# Patient Record
Sex: FEMALE | Race: WHITE | Employment: OTHER | URBAN - METROPOLITAN AREA
[De-identification: names, ages, dates, MRNs, and addresses within clinical notes are randomized per-mention and may not be internally consistent; named-entity substitution may affect disease eponyms.]

---

## 2022-06-04 ENCOUNTER — TELEPHONE ENCOUNTER (OUTPATIENT)
Dept: URBAN - METROPOLITAN AREA CLINIC 68 | Facility: CLINIC | Age: 82
End: 2022-06-04

## 2022-06-04 RX ORDER — VALSARTAN 80 MG/1
TABLET ORAL
OUTPATIENT
Start: 2008-04-04 | End: 2012-07-26

## 2022-06-04 RX ORDER — SACCHAROMYCES BOULARDII 250 MG
CAPSULE ORAL
Qty: 60 | Refills: 0 | OUTPATIENT
Start: 2013-03-28 | End: 2013-04-27

## 2022-06-04 RX ORDER — MESALAMINE 375 MG/1
CAPSULE, EXTENDED RELEASE ORAL DAILY
Qty: 180 | Refills: 180 | OUTPATIENT
Start: 2015-03-30 | End: 2016-09-15

## 2022-06-04 RX ORDER — COLESEVELAM HYDROCHLORIDE 625 MG/1
WELCHOL(    6 TABLETS DAILY ) INACTIVE -HX ENTRY TABLET, FILM COATED ORAL
OUTPATIENT
Start: 2006-04-17

## 2022-06-04 RX ORDER — SODIUM SULFATE, POTASSIUM SULFATE, MAGNESIUM SULFATE 17.5; 3.13; 1.6 G/ML; G/ML; G/ML
SOLUTION, CONCENTRATE ORAL AS DIRECTED
Qty: 1 | Refills: 0 | OUTPATIENT
Start: 2020-02-13 | End: 2020-02-14

## 2022-06-04 RX ORDER — ATENOLOL 25 MG/1
ATENOLOL( 25MG ORAL  DAILY ) INACTIVE -HX ENTRY TABLET ORAL DAILY
OUTPATIENT
Start: 2020-02-13

## 2022-06-04 RX ORDER — COLESEVELAM HYDROCHLORIDE 625 MG/1
TABLET, FILM COATED ORAL
Qty: 90 | Refills: 90 | OUTPATIENT
Start: 2016-10-12 | End: 2020-02-13

## 2022-06-04 RX ORDER — HYOSCYAMINE SULFATE 0.12 MG/1
TABLET, ORALLY DISINTEGRATING ORAL PRN
Qty: 30 | Refills: 30 | OUTPATIENT
Start: 2013-01-08 | End: 2016-09-15

## 2022-06-04 RX ORDER — DICLOFENAC SODIUM 75 MG/1
TABLET, DELAYED RELEASE ORAL
OUTPATIENT
Start: 2011-07-26 | End: 2012-07-26

## 2022-06-04 RX ORDER — PAROXETINE HYDROCHLORIDE 40 MG/1
TABLET, FILM COATED ORAL
OUTPATIENT
Start: 2006-04-17 | End: 2012-07-26

## 2022-06-04 RX ORDER — VALSARTAN 80 MG/1
DIOVAN( 80MG ORAL 1 DAILY ) INACTIVE -HX ENTRY TABLET ORAL DAILY
OUTPATIENT
Start: 2020-02-13

## 2022-06-04 RX ORDER — DICLOFENAC SODIUM 75 MG/1
DICLOFENAC SODIUM( 75MG ORAL 1 DAILY ) INACTIVE -HX ENTRY TABLET, DELAYED RELEASE ORAL DAILY
OUTPATIENT
Start: 2013-03-28

## 2022-06-04 RX ORDER — ERGOCALCIFEROL 1.25 MG/1
CAPSULE ORAL
OUTPATIENT
Start: 2011-06-29 | End: 2012-07-26

## 2022-06-04 RX ORDER — COLESEVELAM HYDROCHLORIDE 625 MG/1
WELCHOL(    ONE TABLET BY MOUTH TWICE DAILY ) INACTIVE -HX ENTRY TABLET, FILM COATED ORAL
OUTPATIENT
Start: 2008-07-09

## 2022-06-04 RX ORDER — ALPRAZOLAM 0.25 MG
XANAX( 0.25MG ORAL  AS NEEDED ) INACTIVE -HX ENTRY TABLET ORAL AS NEEDED
OUTPATIENT
Start: 2020-02-13

## 2022-06-04 RX ORDER — MESALAMINE 400 MG/1
CAPSULE, DELAYED RELEASE ORAL
Qty: 540 | Refills: 540 | OUTPATIENT
Start: 2013-05-13 | End: 2013-05-22

## 2022-06-04 RX ORDER — PREDNISONE 5 MG/1
PREDNISONE 5MG, 1 TABLET TAKE 1 AND 1/2 TABLETS ORALLY EACH DAY FOR 7 DAYS THEN 1 TABELT ORALLY EACH DAY FOR 7 DAYS THEN 1/2 TABLET ORALLY EACH DAY FOR 7 DAYS  THEN 1 AND 1/2 TABLETS ORALLY EACH DAY FOR 1 WEEK THEN 1 TABLET ORALLY EACH DAY FOR 1 WEEK # 60 TABLET ORAL
Qty: 60 | Refills: 60 | OUTPATIENT
Start: 2012-08-29 | End: 2013-01-08

## 2022-06-04 RX ORDER — COLESEVELAM HYDROCHLORIDE 625 MG/1
TABLET, FILM COATED ORAL
OUTPATIENT
Start: 2011-06-29 | End: 2012-07-26

## 2022-06-04 RX ORDER — COLESEVELAM HYDROCHLORIDE 625 MG/1
WELCHOL(    ONE TABLET BY MOUTH TWICE DAILY ) INACTIVE -HX ENTRY TABLET, FILM COATED ORAL
OUTPATIENT
Start: 2008-04-23

## 2022-06-04 RX ORDER — DICLOFENAC SODIUM 75 MG/1
DICLOFENAC SODIUM( 75MG ORAL  DAILY ) INACTIVE -HX ENTRY TABLET, DELAYED RELEASE ORAL DAILY
OUTPATIENT
Start: 2013-10-02

## 2022-06-04 RX ORDER — EZETIMIBE 10 MG/1
ZETIA(    1 TABLET DAILY ) INACTIVE -HX ENTRY TABLET ORAL
OUTPATIENT
Start: 2006-04-17

## 2022-06-04 RX ORDER — RALOXIFENE HYDROCHLORIDE 60 MG/1
RALOXIFENE HCL( 60MG ORAL  DAILY ) INACTIVE -HX ENTRY TABLET, FILM COATED ORAL DAILY
OUTPATIENT
Start: 2020-02-13

## 2022-06-04 RX ORDER — RALOXIFENE HCL 60 MG
EVISTA(    1 TABLET DAILY ) INACTIVE -HX ENTRY TABLET ORAL
OUTPATIENT
Start: 2006-04-17

## 2022-06-04 RX ORDER — CHOLESTYRAMINE 4 G/9G
POWDER, FOR SUSPENSION ORAL
Qty: 1 | Refills: 0 | OUTPATIENT
Start: 2013-03-29 | End: 2013-04-28

## 2022-06-05 ENCOUNTER — TELEPHONE ENCOUNTER (OUTPATIENT)
Dept: URBAN - METROPOLITAN AREA CLINIC 68 | Facility: CLINIC | Age: 82
End: 2022-06-05

## 2022-06-05 RX ORDER — PAROXETINE HYDROCHLORIDE 40 MG/1
PAXIL( 40MG ORAL  DAILY ) ACTIVE -HX ENTRY TABLET, FILM COATED ORAL DAILY
Status: ACTIVE | COMMUNITY
Start: 2020-02-13

## 2022-06-05 RX ORDER — METOPROLOL SUCCINATE 100 MG/1
METOPROLOL SUCCINATE ER( 100MG ORAL 1 DAILY ) ACTIVE -HX ENTRY TABLET, EXTENDED RELEASE ORAL DAILY
Status: ACTIVE | COMMUNITY
Start: 2020-02-13

## 2022-06-05 RX ORDER — COLESEVELAM HYDROCHLORIDE 625 MG/1
WELCHOL( 625MG ORAL 1 FOUR TIMES DAILY ) ACTIVE -HX ENTRY TABLET, FILM COATED ORAL
Status: ACTIVE | COMMUNITY
Start: 2020-02-13

## 2022-06-05 RX ORDER — OMEPRAZOLE 20 MG/1
OMEPRAZOLE( 20MG ORAL  DAILY ) ACTIVE -HX ENTRY CAPSULE, DELAYED RELEASE ORAL DAILY
Status: ACTIVE | COMMUNITY
Start: 2020-02-13

## 2022-06-25 ENCOUNTER — TELEPHONE ENCOUNTER (OUTPATIENT)
Age: 82
End: 2022-06-25

## 2022-06-25 RX ORDER — ALPRAZOLAM 0.25 MG
XANAX( 0.25MG ORAL  AS NEEDED ) INACTIVE -HX ENTRY TABLET ORAL AS NEEDED
OUTPATIENT
Start: 2020-02-13

## 2022-06-25 RX ORDER — COLESEVELAM HYDROCHLORIDE 625 MG/1
WELCHOL(    ONE TABLET BY MOUTH TWICE DAILY ) INACTIVE -HX ENTRY TABLET, FILM COATED ORAL
OUTPATIENT
Start: 2008-07-09

## 2022-06-25 RX ORDER — ALPRAZOLAM 0.5 MG/1
ALPRAZOLAM(    PRN ) INACTIVE -HX ENTRY TABLET, EXTENDED RELEASE ORAL PRN
OUTPATIENT
Start: 2011-07-26

## 2022-06-25 RX ORDER — ASPIRIN 81 MG/1
LOW-DOSE ASPIRIN( 81MG ORAL  DAILY ) INACTIVE -HX ENTRY TABLET, COATED ORAL DAILY
OUTPATIENT
Start: 2013-03-28

## 2022-06-25 RX ORDER — LIDOCAINE HYDROCHLORIDE 20 MG/ML
SOLUTION TOPICAL
Qty: 1 | Refills: 0 | OUTPATIENT
Start: 2013-04-02 | End: 2013-05-02

## 2022-06-25 RX ORDER — MESALAMINE 800 MG/1
ASACOL(    2 TABLETS 3 TIMES DAILY ) INACTIVE -HX ENTRY TABLET, DELAYED RELEASE ORAL
OUTPATIENT
Start: 2006-04-17

## 2022-06-25 RX ORDER — ATENOLOL 25 MG/1
ATENOLOL(    1 TABLET DAILY ) INACTIVE -HX ENTRY TABLET ORAL
OUTPATIENT
Start: 2011-07-26

## 2022-06-25 RX ORDER — ATENOLOL 25 MG/1
ATENOLOL(    1 TABLET DAILY ) INACTIVE -HX ENTRY TABLET ORAL
OUTPATIENT
Start: 2011-06-29

## 2022-06-25 RX ORDER — ASPIRIN 81 MG/1
BABY ASPIRIN( 81MG ORAL   ) INACTIVE -HX ENTRY TABLET, COATED ORAL
OUTPATIENT
Start: 2015-03-30

## 2022-06-25 RX ORDER — RALOXIFENE HCL 60 MG
EVISTA(    1 TABLET DAILY ) INACTIVE -HX ENTRY TABLET ORAL
OUTPATIENT
Start: 2006-04-17

## 2022-06-25 RX ORDER — CHOLESTYRAMINE 4 G/9G
POWDER, FOR SUSPENSION ORAL
Qty: 1 | Refills: 0 | OUTPATIENT
Start: 2013-03-29 | End: 2013-04-28

## 2022-06-25 RX ORDER — COLESEVELAM HYDROCHLORIDE 625 MG/1
WELCHOL(    ONE TABLET BY MOUTH TWICE DAILY ) INACTIVE -HX ENTRY TABLET, FILM COATED ORAL
OUTPATIENT
Start: 2008-04-23

## 2022-06-25 RX ORDER — SODIUM SULFATE, POTASSIUM SULFATE, MAGNESIUM SULFATE 17.5; 3.13; 1.6 G/ML; G/ML; G/ML
SOLUTION, CONCENTRATE ORAL AS DIRECTED
Qty: 1 | Refills: 0 | OUTPATIENT
Start: 2020-02-13 | End: 2020-02-14

## 2022-06-25 RX ORDER — HYOSCYAMINE SULFATE 0.12 MG/1
LEVSIN/SL(    1TAB SL Q4-6 HOURS PRN ) INACTIVE -HX ENTRY TABLET, ORALLY DISINTEGRATING ORAL
OUTPATIENT
Start: 2011-06-29

## 2022-06-25 RX ORDER — MESALAMINE 400 MG/1
CAPSULE, DELAYED RELEASE ORAL
Qty: 540 | Refills: 540 | OUTPATIENT
Start: 2013-05-13 | End: 2013-05-22

## 2022-06-25 RX ORDER — ATENOLOL 25 MG/1
ATENOLOL( 25MG ORAL  DAILY ) INACTIVE -HX ENTRY TABLET ORAL DAILY
OUTPATIENT
Start: 2020-02-13

## 2022-06-25 RX ORDER — MESALAMINE 800 MG/1
ASACOL(    3TID ) INACTIVE -HX ENTRY TABLET, DELAYED RELEASE ORAL
OUTPATIENT
Start: 2008-04-04

## 2022-06-25 RX ORDER — COLESEVELAM HYDROCHLORIDE 625 MG/1
TABLET, FILM COATED ORAL
OUTPATIENT
Start: 2011-06-29 | End: 2012-07-26

## 2022-06-25 RX ORDER — MESALAMINE 800 MG/1
ASACOL( 400MG ORAL  6 TABS DAILY ) INACTIVE -HX ENTRY TABLET, DELAYED RELEASE ORAL
OUTPATIENT
Start: 2013-05-13

## 2022-06-25 RX ORDER — ERGOCALCIFEROL 1.25 MG/1
CAPSULE ORAL
OUTPATIENT
Start: 2011-06-29 | End: 2012-07-26

## 2022-06-25 RX ORDER — VALSARTAN 80 MG
DIOVAN( 80MG ORAL 1 DAILY ) INACTIVE -HX ENTRY TABLET ORAL DAILY
OUTPATIENT
Start: 2020-02-13

## 2022-06-25 RX ORDER — PREDNISONE 5 MG/1
PREDNISONE 5MG, 1 TABLET TAKE 1 AND 1/2 TABLETS ORALLY EACH DAY FOR 7 DAYS THEN 1 TABELT ORALLY EACH DAY FOR 7 DAYS THEN 1/2 TABLET ORALLY EACH DAY FOR 7 DAYS  THEN 1 AND 1/2 TABLETS ORALLY EACH DAY FOR 1 WEEK THEN 1 TABLET ORALLY EACH DAY FOR 1 WEEK # 60 TABLET ORAL
Qty: 60 | Refills: 60 | OUTPATIENT
Start: 2012-08-29 | End: 2013-01-08

## 2022-06-25 RX ORDER — EZETIMIBE 10 MG/1
ZETIA(    1 TABLET DAILY ) INACTIVE -HX ENTRY TABLET ORAL
OUTPATIENT
Start: 2006-04-17

## 2022-06-25 RX ORDER — RALOXIFENE HYDROCHLORIDE 60 MG/1
RALOXIFENE HCL( 60MG ORAL  DAILY ) INACTIVE -HX ENTRY TABLET, FILM COATED ORAL DAILY
OUTPATIENT
Start: 2020-02-13

## 2022-06-25 RX ORDER — VALSARTAN 80 MG
TABLET ORAL
OUTPATIENT
Start: 2008-04-04 | End: 2012-07-26

## 2022-06-25 RX ORDER — PAROXETINE HYDROCHLORIDE 40 MG/1
TABLET, FILM COATED ORAL
OUTPATIENT
Start: 2006-04-17 | End: 2012-07-26

## 2022-06-25 RX ORDER — POLYETHYLENE GLYCOL 3350, SODIUM SULFATE, SODIUM CHLORIDE, POTASSIUM CHLORIDE, ASCORBIC ACID, SODIUM ASCORBATE 7.5-2.691G
MOVIPREP(    AS DIRECTED ) INACTIVE -HX ENTRY KIT ORAL AS DIRECTED
OUTPATIENT
Start: 2011-06-29

## 2022-06-25 RX ORDER — ASPIRIN 81 MG/1
TABLET, DELAYED RELEASE ORAL
OUTPATIENT
Start: 2006-04-17 | End: 2012-07-26

## 2022-06-25 RX ORDER — COLESEVELAM HYDROCHLORIDE 625 MG/1
TABLET, FILM COATED ORAL
Qty: 90 | Refills: 90 | OUTPATIENT
Start: 2016-10-12 | End: 2020-02-13

## 2022-06-25 RX ORDER — SACCHAROMYCES BOULARDII 250 MG
CAPSULE ORAL
Qty: 60 | Refills: 0 | OUTPATIENT
Start: 2013-03-28 | End: 2013-04-27

## 2022-06-25 RX ORDER — MESALAMINE 800 MG/1
ASACOL(    3 TABLETS ORALLY THREE TIMES EACH DAY ) INACTIVE -HX ENTRY TABLET, DELAYED RELEASE ORAL
OUTPATIENT
Start: 2008-01-14

## 2022-06-25 RX ORDER — DICLOFENAC SODIUM 1 %
VOLTAREN(    1 TABLET TWICE DAILY ) INACTIVE -HX ENTRY GEL (GRAM) TOPICAL
OUTPATIENT
Start: 2011-06-29

## 2022-06-25 RX ORDER — DICLOFENAC SODIUM 75 MG/1
DICLOFENAC SODIUM( 75MG ORAL 1 DAILY ) INACTIVE -HX ENTRY TABLET, DELAYED RELEASE ORAL DAILY
OUTPATIENT
Start: 2013-03-28

## 2022-06-25 RX ORDER — CHOLECALCIFEROL (VITAMIN D3) 25 MCG
VITAMIN D( 1000UNIT ORAL  DAILY ) INACTIVE -HX ENTRY TABLET ORAL DAILY
OUTPATIENT
Start: 2015-03-30

## 2022-06-25 RX ORDER — VANCOMYCIN 1 G/200ML
INJECTION, SOLUTION INTRAVENOUS
Qty: 280 | Refills: 0 | OUTPATIENT
Start: 2013-04-02 | End: 2013-04-16

## 2022-06-25 RX ORDER — HYOSCYAMINE SULFATE 0.12 MG/1
TABLET, ORALLY DISINTEGRATING ORAL PRN
Qty: 30 | Refills: 30 | OUTPATIENT
Start: 2013-01-08 | End: 2016-09-15

## 2022-06-25 RX ORDER — BISACODYL 5 MG/1
HALFLYTELY & BISACODYL(    AS DIRECTED ) INACTIVE -HX ENTRY TABLET, COATED ORAL AS DIRECTED
OUTPATIENT
Start: 2008-04-04

## 2022-06-25 RX ORDER — DICLOFENAC SODIUM 75 MG/1
DICLOFENAC SODIUM( 75MG ORAL  DAILY ) INACTIVE -HX ENTRY TABLET, DELAYED RELEASE ORAL DAILY
OUTPATIENT
Start: 2013-10-02

## 2022-06-25 RX ORDER — BUDESONIDE 9 MG/1
ENTOCORT EC(    TAKE 3 TABLETS ORALLY EACH DAY ) INACTIVE -HX ENTRY TABLET, EXTENDED RELEASE ORAL
OUTPATIENT
Start: 2011-07-26

## 2022-06-25 RX ORDER — MESALAMINE 375 MG/1
CAPSULE, EXTENDED RELEASE ORAL DAILY
Qty: 180 | Refills: 180 | OUTPATIENT
Start: 2015-03-30 | End: 2016-09-15

## 2022-06-25 RX ORDER — COLESEVELAM HYDROCHLORIDE 625 MG/1
WELCHOL(    6 TABLETS DAILY ) INACTIVE -HX ENTRY TABLET, FILM COATED ORAL
OUTPATIENT
Start: 2006-04-17

## 2022-06-25 RX ORDER — DICLOFENAC SODIUM 75 MG/1
TABLET, DELAYED RELEASE ORAL
OUTPATIENT
Start: 2011-07-26 | End: 2012-07-26

## 2022-06-25 RX ORDER — SODIUM SULFATE, POTASSIUM SULFATE, MAGNESIUM SULFATE 17.5; 3.13; 1.6 G/ML; G/ML; G/ML
SOLUTION, CONCENTRATE ORAL AS DIRECTED
Qty: 1 | Refills: 0 | OUTPATIENT
Start: 2016-09-15 | End: 2016-09-16

## 2022-06-26 ENCOUNTER — TELEPHONE ENCOUNTER (OUTPATIENT)
Age: 82
End: 2022-06-26

## 2022-06-26 RX ORDER — PAROXETINE HYDROCHLORIDE 40 MG/1
PAXIL( 40MG ORAL  DAILY ) ACTIVE -HX ENTRY TABLET, FILM COATED ORAL DAILY
Status: ACTIVE | COMMUNITY
Start: 2020-02-13

## 2022-06-26 RX ORDER — METOPROLOL SUCCINATE 100 MG/1
METOPROLOL SUCCINATE ER( 100MG ORAL 1 DAILY ) ACTIVE -HX ENTRY TABLET, EXTENDED RELEASE ORAL DAILY
Status: ACTIVE | COMMUNITY
Start: 2020-02-13

## 2022-06-26 RX ORDER — COLESEVELAM HYDROCHLORIDE 625 MG/1
WELCHOL( 625MG ORAL 1 FOUR TIMES DAILY ) ACTIVE -HX ENTRY TABLET, FILM COATED ORAL
Status: ACTIVE | COMMUNITY
Start: 2020-02-13

## 2022-06-26 RX ORDER — OMEPRAZOLE 20 MG/1
OMEPRAZOLE( 20MG ORAL  DAILY ) ACTIVE -HX ENTRY CAPSULE, DELAYED RELEASE ORAL DAILY
Status: ACTIVE | COMMUNITY
Start: 2020-02-13

## 2023-03-01 ENCOUNTER — OFFICE VISIT (OUTPATIENT)
Dept: URBAN - METROPOLITAN AREA CLINIC 68 | Facility: CLINIC | Age: 83
End: 2023-03-01

## 2025-04-09 ENCOUNTER — LAB OUTSIDE AN ENCOUNTER (OUTPATIENT)
Dept: URBAN - METROPOLITAN AREA CLINIC 68 | Facility: CLINIC | Age: 85
End: 2025-04-09

## 2025-04-09 ENCOUNTER — OFFICE VISIT (OUTPATIENT)
Dept: URBAN - METROPOLITAN AREA CLINIC 68 | Facility: CLINIC | Age: 85
End: 2025-04-09
Payer: MEDICARE

## 2025-04-09 DIAGNOSIS — K51.90 ULCERATIVE COLITIS WITHOUT COMPLICATIONS, UNSPECIFIED LOCATION: ICD-10-CM

## 2025-04-09 PROBLEM — 64766004: Status: ACTIVE | Noted: 2025-04-09

## 2025-04-09 PROCEDURE — 99204 OFFICE O/P NEW MOD 45 MIN: CPT | Performed by: INTERNAL MEDICINE

## 2025-04-09 RX ORDER — PAROXETINE HYDROCHLORIDE 40 MG/1
PAXIL( 40MG ORAL  DAILY ) ACTIVE -HX ENTRY TABLET, FILM COATED ORAL DAILY
Status: ACTIVE | COMMUNITY
Start: 2020-02-13

## 2025-04-09 RX ORDER — SOD SULF/POT CHLORIDE/MAG SULF 1.479 G
12 TABLETS THE FIRST DOSE THE EVENING BEFORE AND SECOND DOSE THE MORNING OF COLONOSCOPY TABLET ORAL TWICE A DAY
Qty: 24 | OUTPATIENT
Start: 2025-04-09 | End: 2025-04-10

## 2025-04-09 RX ORDER — COLESEVELAM HYDROCHLORIDE 625 MG/1
WELCHOL( 625MG ORAL 1 FOUR TIMES DAILY ) ACTIVE -HX ENTRY TABLET, FILM COATED ORAL
Status: ACTIVE | COMMUNITY
Start: 2020-02-13

## 2025-04-09 RX ORDER — OMEPRAZOLE 20 MG/1
OMEPRAZOLE( 20MG ORAL  DAILY ) ACTIVE -HX ENTRY CAPSULE, DELAYED RELEASE ORAL DAILY
Status: ACTIVE | COMMUNITY
Start: 2020-02-13

## 2025-04-09 RX ORDER — METOPROLOL SUCCINATE 100 MG/1
METOPROLOL SUCCINATE ER( 100MG ORAL 1 DAILY ) ACTIVE -HX ENTRY TABLET, EXTENDED RELEASE ORAL DAILY
Status: ACTIVE | COMMUNITY
Start: 2020-02-13

## 2025-04-09 NOTE — HPI-TODAY'S VISIT:
84 y.o. WF diagnosed with UC over 40 years ago who is here for further management. She takes Colesevelam 2 caps/d and is having for the most part having regular stools. She is s/p a colonoscopy in 2020 with Dr. Martell which showed inflammation on in the ascending and transverse colon, IH. She denies any gib.

## 2025-04-15 ENCOUNTER — OFFICE VISIT (OUTPATIENT)
Dept: URBAN - METROPOLITAN AREA SURGERY CENTER 12 | Facility: SURGERY CENTER | Age: 85
End: 2025-04-15
Payer: MEDICARE

## 2025-04-15 ENCOUNTER — CLAIMS CREATED FROM THE CLAIM WINDOW (OUTPATIENT)
Dept: URBAN - METROPOLITAN AREA CLINIC 4 | Facility: CLINIC | Age: 85
End: 2025-04-15
Payer: MEDICARE

## 2025-04-15 DIAGNOSIS — K64.0 FIRST DEGREE HEMORRHOIDS: ICD-10-CM

## 2025-04-15 DIAGNOSIS — K51.90 ULCERATIVE COLITIS WITHOUT COMPLICATIONS, UNSPECIFIED LOCATION: ICD-10-CM

## 2025-04-15 DIAGNOSIS — K57.30 DIVERTICULOSIS OF LARGE INTESTINE WITHOUT PERFORATION OR ABSCESS WITHOUT BLEEDING: ICD-10-CM

## 2025-04-15 DIAGNOSIS — K63.89 OTHER SPECIFIED DISEASES OF INTESTINE: ICD-10-CM

## 2025-04-15 PROCEDURE — 45380 COLONOSCOPY AND BIOPSY: CPT | Performed by: INTERNAL MEDICINE

## 2025-04-15 PROCEDURE — 00811 ANES LWR INTST NDSC NOS: CPT | Performed by: NURSE ANESTHETIST, CERTIFIED REGISTERED

## 2025-04-15 PROCEDURE — 88305 TISSUE EXAM BY PATHOLOGIST: CPT | Performed by: PATHOLOGY

## 2025-04-15 PROCEDURE — 45380 COLONOSCOPY AND BIOPSY: CPT | Performed by: CLINIC/CENTER

## 2025-04-15 RX ORDER — OMEPRAZOLE 20 MG/1
OMEPRAZOLE( 20MG ORAL  DAILY ) ACTIVE -HX ENTRY CAPSULE, DELAYED RELEASE ORAL DAILY
Status: ACTIVE | COMMUNITY
Start: 2020-02-13

## 2025-04-15 RX ORDER — METOPROLOL SUCCINATE 100 MG/1
METOPROLOL SUCCINATE ER( 100MG ORAL 1 DAILY ) ACTIVE -HX ENTRY TABLET, EXTENDED RELEASE ORAL DAILY
Status: ACTIVE | COMMUNITY
Start: 2020-02-13

## 2025-04-15 RX ORDER — PAROXETINE HYDROCHLORIDE 40 MG/1
PAXIL( 40MG ORAL  DAILY ) ACTIVE -HX ENTRY TABLET, FILM COATED ORAL DAILY
Status: ACTIVE | COMMUNITY
Start: 2020-02-13

## 2025-04-15 RX ORDER — COLESEVELAM HYDROCHLORIDE 625 MG/1
WELCHOL( 625MG ORAL 1 FOUR TIMES DAILY ) ACTIVE -HX ENTRY TABLET, FILM COATED ORAL
Status: ACTIVE | COMMUNITY
Start: 2020-02-13

## 2025-04-29 ENCOUNTER — OFFICE VISIT (OUTPATIENT)
Dept: URBAN - METROPOLITAN AREA CLINIC 68 | Facility: CLINIC | Age: 85
End: 2025-04-29
Payer: MEDICARE

## 2025-04-29 ENCOUNTER — DASHBOARD ENCOUNTERS (OUTPATIENT)
Age: 85
End: 2025-04-29

## 2025-04-29 DIAGNOSIS — K51.90 ULCERATIVE COLITIS, UNSPECIFIED, WITHOUT COMPLICATIONS: ICD-10-CM

## 2025-04-29 PROCEDURE — 99213 OFFICE O/P EST LOW 20 MIN: CPT

## 2025-04-29 RX ORDER — COLESEVELAM HYDROCHLORIDE 625 MG/1
WELCHOL( 625MG ORAL 1 FOUR TIMES DAILY ) ACTIVE -HX ENTRY TABLET, FILM COATED ORAL
Status: ACTIVE | COMMUNITY
Start: 2020-02-13

## 2025-04-29 RX ORDER — METOPROLOL SUCCINATE 100 MG/1
METOPROLOL SUCCINATE ER( 100MG ORAL 1 DAILY ) ACTIVE -HX ENTRY TABLET, EXTENDED RELEASE ORAL DAILY
Status: ACTIVE | COMMUNITY
Start: 2020-02-13

## 2025-04-29 RX ORDER — OMEPRAZOLE 20 MG/1
OMEPRAZOLE( 20MG ORAL  DAILY ) ACTIVE -HX ENTRY CAPSULE, DELAYED RELEASE ORAL DAILY
Status: ACTIVE | COMMUNITY
Start: 2020-02-13

## 2025-04-29 RX ORDER — PAROXETINE HYDROCHLORIDE 40 MG/1
PAXIL( 40MG ORAL  DAILY ) ACTIVE -HX ENTRY TABLET, FILM COATED ORAL DAILY
Status: ACTIVE | COMMUNITY
Start: 2020-02-13

## 2025-04-29 NOTE — HPI-TODAY'S VISIT:
84 y.o. WF diagnosed with UC over 40 years ago who is here for results s/p colonoscopy. She is s/p colonoscopy on 4/15/2025 that showed many diverticulosis, tortuous colon, IH, random colon biopsies normal, excellent prep. She takes Colesevelam 2 caps/d and is having for the most part having regular stools. Denies gib.